# Patient Record
Sex: MALE | URBAN - METROPOLITAN AREA
[De-identification: names, ages, dates, MRNs, and addresses within clinical notes are randomized per-mention and may not be internally consistent; named-entity substitution may affect disease eponyms.]

---

## 2024-10-17 ENCOUNTER — OFFICE VISIT (OUTPATIENT)
Dept: URBAN - METROPOLITAN AREA CLINIC 9 | Facility: CLINIC | Age: 49
End: 2024-10-17

## 2024-10-17 ENCOUNTER — DASHBOARD ENCOUNTERS (OUTPATIENT)
Age: 49
End: 2024-10-17

## 2024-10-17 RX ORDER — ROSUVASTATIN CALCIUM 20 MG/1
TAKE 1 TABLET BY MOUTH EVERY NIGHT TABLET, FILM COATED ORAL
Qty: 90 EACH | Refills: 1 | COMMUNITY

## 2024-10-17 RX ORDER — METOPROLOL SUCCINATE 25 MG/1
TAKE 1 TABLET BY MOUTH DAILY TABLET, EXTENDED RELEASE ORAL
Qty: 90 EACH | Refills: 1 | COMMUNITY

## 2024-10-17 RX ORDER — OMEPRAZOLE 40 MG/1
CAPSULE, DELAYED RELEASE ORAL
Qty: 90 APPLICATOR | Refills: 0 | COMMUNITY

## 2024-10-17 RX ORDER — PRASUGREL 10 MG/1
TAKE 1 TABLET BY MOUTH DAILY TABLET, FILM COATED ORAL
Qty: 90 EACH | Refills: 1 | COMMUNITY

## 2024-10-17 RX ORDER — SUCRALFATE 1 G/1
TABLET ORAL
Qty: 120 TABLET | Refills: 1 | COMMUNITY

## 2024-10-17 NOTE — HPI-TODAY'S VISIT:
EGD: 9/3/24- [facility]- mildly irregular GEJ (bx: PPI effect), gastritis (bx: chronic gastirtis, +H pylori), normla duodenum.  Colonoscopy: 9/3/24- [facility]- normal colon (bx: ), medium IH  Imaging/Studies/Procedures: 2/2024- CBC, CMP, normal. RUQ U/S 5/6/24- normal.  Barium esophagram 2/2024- normal.  - -   PMH:  H pylori dx in 9/2024,   Family Hx:   GI Hx: 10/17/24-